# Patient Record
Sex: FEMALE | Race: WHITE
[De-identification: names, ages, dates, MRNs, and addresses within clinical notes are randomized per-mention and may not be internally consistent; named-entity substitution may affect disease eponyms.]

---

## 2018-08-28 ENCOUNTER — HOSPITAL ENCOUNTER (EMERGENCY)
Dept: HOSPITAL 47 - EC | Age: 68
LOS: 1 days | Discharge: HOME | End: 2018-08-29
Payer: MEDICARE

## 2018-08-28 DIAGNOSIS — Z79.899: ICD-10-CM

## 2018-08-28 DIAGNOSIS — G51.0: Primary | ICD-10-CM

## 2018-08-28 LAB
ALBUMIN SERPL-MCNC: 3.7 G/DL (ref 3.5–5)
ALP SERPL-CCNC: 108 U/L (ref 38–126)
ALT SERPL-CCNC: 44 U/L (ref 9–52)
ANION GAP SERPL CALC-SCNC: 7 MMOL/L
APTT BLD: 24.2 SEC (ref 22–30)
AST SERPL-CCNC: 25 U/L (ref 14–36)
BASOPHILS # BLD AUTO: 0 K/UL (ref 0–0.2)
BASOPHILS NFR BLD AUTO: 1 %
BUN SERPL-SCNC: 19 MG/DL (ref 7–17)
CALCIUM SPEC-MCNC: 9.4 MG/DL (ref 8.4–10.2)
CHLORIDE SERPL-SCNC: 106 MMOL/L (ref 98–107)
CO2 SERPL-SCNC: 26 MMOL/L (ref 22–30)
EOSINOPHIL # BLD AUTO: 0.2 K/UL (ref 0–0.7)
EOSINOPHIL NFR BLD AUTO: 3 %
ERYTHROCYTE [DISTWIDTH] IN BLOOD BY AUTOMATED COUNT: 4.5 M/UL (ref 3.8–5.4)
ERYTHROCYTE [DISTWIDTH] IN BLOOD: 13.3 % (ref 11.5–15.5)
GLUCOSE SERPL-MCNC: 98 MG/DL (ref 74–99)
HCT VFR BLD AUTO: 42.9 % (ref 34–46)
HGB BLD-MCNC: 14.1 GM/DL (ref 11.4–16)
INR PPP: 1 (ref ?–1.2)
LYMPHOCYTES # SPEC AUTO: 1.3 K/UL (ref 1–4.8)
LYMPHOCYTES NFR SPEC AUTO: 25 %
MCH RBC QN AUTO: 31.4 PG (ref 25–35)
MCHC RBC AUTO-ENTMCNC: 32.9 G/DL (ref 31–37)
MCV RBC AUTO: 95.4 FL (ref 80–100)
MONOCYTES # BLD AUTO: 0.3 K/UL (ref 0–1)
MONOCYTES NFR BLD AUTO: 6 %
NEUTROPHILS # BLD AUTO: 3.4 K/UL (ref 1.3–7.7)
NEUTROPHILS NFR BLD AUTO: 64 %
PLATELET # BLD AUTO: 229 K/UL (ref 150–450)
POTASSIUM SERPL-SCNC: 4.4 MMOL/L (ref 3.5–5.1)
PROT SERPL-MCNC: 6.2 G/DL (ref 6.3–8.2)
PT BLD: 9.5 SEC (ref 9–12)
SODIUM SERPL-SCNC: 139 MMOL/L (ref 137–145)
WBC # BLD AUTO: 5.3 K/UL (ref 3.8–10.6)

## 2018-08-28 PROCEDURE — 36415 COLL VENOUS BLD VENIPUNCTURE: CPT

## 2018-08-28 PROCEDURE — 99284 EMERGENCY DEPT VISIT MOD MDM: CPT

## 2018-08-28 PROCEDURE — 85610 PROTHROMBIN TIME: CPT

## 2018-08-28 PROCEDURE — 85730 THROMBOPLASTIN TIME PARTIAL: CPT

## 2018-08-28 PROCEDURE — 70450 CT HEAD/BRAIN W/O DYE: CPT

## 2018-08-28 PROCEDURE — 96361 HYDRATE IV INFUSION ADD-ON: CPT

## 2018-08-28 PROCEDURE — 85025 COMPLETE CBC W/AUTO DIFF WBC: CPT

## 2018-08-28 PROCEDURE — 70496 CT ANGIOGRAPHY HEAD: CPT

## 2018-08-28 PROCEDURE — 80053 COMPREHEN METABOLIC PANEL: CPT

## 2018-08-28 PROCEDURE — 70498 CT ANGIOGRAPHY NECK: CPT

## 2018-08-28 PROCEDURE — 96360 HYDRATION IV INFUSION INIT: CPT

## 2018-08-28 NOTE — CT
EXAM:

  CT Head Without Intravenous Contrast

 

CLINICAL HISTORY:

  ITS.REASON CT Reason: weak

 

TECHNIQUE:

  Axial computed tomography images of the head/brain without intravenous 

contrast.  CTDI is 59.43 mGy and DLP is 1167.78 mGy-cm.  This CT exam was 

performed using one or more of the following dose reduction techniques: 

automated exposure control, adjustment of the mA and/or kV according to 

patient size, and/or use of iterative reconstruction technique.

 

COMPARISON:

  No relevant prior studies available.

 

FINDINGS:

  Brain:  Unremarkable.  No hemorrhage.  No significant white matter 

disease.  No edema.

  Ventricles:  Unremarkable.  No ventriculomegaly.

  Bones/joints:  Unremarkable.  No acute fracture.

  Soft tissues:  Unremarkable.

  Sinuses:  Unremarkable as visualized.  No acute sinusitis.

  Mastoid air cells:  Unremarkable as visualized.  No mastoid effusion.

 

IMPRESSION:     

  Normal for age.

## 2018-08-28 NOTE — ED
General Adult HPI





- General


Chief complaint: Neuro Symptoms/Deficit


Stated complaint: Left facial weakness


Time Seen by Provider: 08/28/18 19:48


Source: patient, RN notes reviewed


Mode of arrival: wheelchair


Limitations: no limitations





- History of Present Illness


Initial comments: 





Patient is a pleasant 67-year-old female presenting to the emergency Department 

with complaints of left facial weakness.  Onset of symptoms was yesterday 

around noon.  Symptoms seemed to have worsened since that time.  Patient 

notices when she tries to eat or drink there is some leaking from the left side 

of her mouth.  No confusion.  Patient did have a mild frontal headache earlier.

  Patient has had some chronic neck discomfort for the past several months.  No 

speech problems.  No extremity weakness.  No paresthesias or loss of sensation.

  No history of similar symptoms previously.





- Related Data


 Home Medications











 Medication  Instructions  Recorded  Confirmed


 


Biotin 5 mg PO DAILY 08/28/18 08/28/18


 


Ibgard 1 tab PO DAILY 08/28/18 08/28/18


 


L.acidoph,Paracasei, B.lactis 1 cap PO DAILY 08/28/18 08/28/18





[Probiotic]   


 


Levothyroxine Sodium 100 mcg PO DAILY 08/28/18 08/28/18


 


[place] 1 tab PO DAILY 08/28/18 08/28/18


 


Multivitamins, Thera [Multivitamin 1 tab PO DAILY 08/28/18 08/28/18





(formulary)]   


 


Omeprazole 20 mg PO DAILY 08/28/18 08/28/18


 


Ranitidine HCl [Zantac] 300 mg PO BID 08/28/18 08/28/18








 Previous Rx's











 Medication  Instructions  Recorded


 


predniSONE 20 mg PO TID #20 tab 08/29/18


 


valACYclovir HCL [Valtrex] 1 tab PO TID #20 tablet 08/29/18











 Allergies











Allergy/AdvReac Type Severity Reaction Status Date / Time


 


No Known Allergies Allergy   Verified 08/28/18 20:41














Review of Systems


ROS Statement: 


Those systems with pertinent positive or pertinent negative responses have been 

documented in the HPI.





ROS Other: All systems not noted in ROS Statement are negative.


Constitutional: Denies: fever


Eyes: Reports: other (Left eye does feel dry).  Denies: eye pain


ENT: Denies: ear pain


Respiratory: Denies: cough


Cardiovascular: Denies: chest pain


Endocrine: Denies: fatigue


Gastrointestinal: Denies: abdominal pain


Genitourinary: Denies: dysuria


Musculoskeletal: Denies: back pain


Skin: Denies: rash


Neurological: Reports: weakness (Left side of face).  Denies: confusion





Past Medical History


Past Medical History: No Reported History


History of Any Multi-Drug Resistant Organisms: None Reported


Past Surgical History: Cholecystectomy, Hysterectomy, Orthopedic Surgery


Past Psychological History: No Psychological Hx Reported


Smoking Status: Never smoker


Past Alcohol Use History: None Reported


Past Drug Use History: None Reported





General Exam


Limitations: no limitations


General appearance: alert, in no apparent distress


Head exam: Present: atraumatic


Eye exam: Present: normal appearance, PERRL, EOMI.  Absent: nystagmus


ENT exam: Present: normal oropharynx


Neck exam: Present: normal inspection


Respiratory exam: Present: normal lung sounds bilaterally


Cardiovascular Exam: Present: regular rate, normal rhythm


GI/Abdominal exam: Present: soft.  Absent: tenderness


Extremities exam: Present: normal inspection


Neurological exam: Present: alert, CN II-XII intact (Except for left facial 

weakness that does include the forehead and eyelid muscles), other (Left facial 

weakness is present that does involve the forehead).  Absent: motor sensory 

deficit


  ** Expanded


Neurological exam: Present: protecting the airway


Patient oriented to: Present: person, place, time


Speech: Present: fluid speech


Cranial nerves: EOM's Intact: Normal


Cerebellar function: Finger to Nose: Normal


Sensory exam: Upper Extremity Light Touch: Normal, Lower Extremity Light Touch: 

Normal


Motor strength exam: RUE: 5, LUE: 5, RLE: 5, LLE: 5


Eye Response: (4) open spontaneously


Motor Response: (6) obeys commands


Verbal Response: (5) oriented


Psychiatric exam: Present: normal affect, normal mood


Skin exam: Present: normal color





Course


 Vital Signs











  08/28/18 08/28/18 08/28/18





  19:37 19:45 20:00


 


Temperature 97.8 F  


 


Pulse Rate 73 60 62


 


Respiratory 18 18 18





Rate   


 


Blood Pressure 174/91 161/72 171/77


 


O2 Sat by Pulse 99 99 98





Oximetry   














  08/28/18





  22:29


 


Temperature 


 


Pulse Rate 64


 


Respiratory 17





Rate 


 


Blood Pressure 142/81


 


O2 Sat by Pulse 98





Oximetry 














Medical Decision Making





- Medical Decision Making





Patient reevaluated and updated.





- Lab Data


Result diagrams: 


 08/28/18 20:45





 08/28/18 20:45


 Lab Results











  08/28/18 08/28/18 08/28/18 Range/Units





  20:45 20:45 20:45 


 


WBC  5.3    (3.8-10.6)  k/uL


 


RBC  4.50    (3.80-5.40)  m/uL


 


Hgb  14.1    (11.4-16.0)  gm/dL


 


Hct  42.9    (34.0-46.0)  %


 


MCV  95.4    (80.0-100.0)  fL


 


MCH  31.4    (25.0-35.0)  pg


 


MCHC  32.9    (31.0-37.0)  g/dL


 


RDW  13.3    (11.5-15.5)  %


 


Plt Count  229    (150-450)  k/uL


 


Neutrophils %  64    %


 


Lymphocytes %  25    %


 


Monocytes %  6    %


 


Eosinophils %  3    %


 


Basophils %  1    %


 


Neutrophils #  3.4    (1.3-7.7)  k/uL


 


Lymphocytes #  1.3    (1.0-4.8)  k/uL


 


Monocytes #  0.3    (0-1.0)  k/uL


 


Eosinophils #  0.2    (0-0.7)  k/uL


 


Basophils #  0.0    (0-0.2)  k/uL


 


PT    9.5  (9.0-12.0)  sec


 


INR    1.0  (<1.2)  


 


APTT    24.2  (22.0-30.0)  sec


 


Sodium   139   (137-145)  mmol/L


 


Potassium   4.4   (3.5-5.1)  mmol/L


 


Chloride   106   ()  mmol/L


 


Carbon Dioxide   26   (22-30)  mmol/L


 


Anion Gap   7   mmol/L


 


BUN   19 H   (7-17)  mg/dL


 


Creatinine   0.86   (0.52-1.04)  mg/dL


 


Est GFR (CKD-EPI)AfAm   82   (>60 ml/min/1.73 sqM)  


 


Est GFR (CKD-EPI)NonAf   71   (>60 ml/min/1.73 sqM)  


 


Glucose   98   (74-99)  mg/dL


 


Calcium   9.4   (8.4-10.2)  mg/dL


 


Total Bilirubin   0.3   (0.2-1.3)  mg/dL


 


AST   25   (14-36)  U/L


 


ALT   44   (9-52)  U/L


 


Alkaline Phosphatase   108   ()  U/L


 


Total Protein   6.2 L   (6.3-8.2)  g/dL


 


Albumin   3.7   (3.5-5.0)  g/dL














- Radiology Data


Radiology results: report reviewed (Computed tomography scan the brain shows no 

acute process.  CT angiogram of the head and neck shows no acute process)





Disposition


Clinical Impression: 


 Bell's palsy





Disposition: HOME SELF-CARE


Condition: Stable


Instructions:  Bell Palsy (ED)


Additional Instructions: 


Apply Lacri-Lube to left eye every 4 hours while awake and prior to going to 

bed.  Please tape her eye shut at bedtime.  These follow-up with primary care 

physician in the next couple of days for recheck.


Prescriptions: 


predniSONE 20 mg PO TID #20 tab


valACYclovir HCL [Valtrex] 1 tab PO TID #20 tablet


Is patient prescribed a controlled substance at d/c from ED?: No


Referrals: 


Yarelis Mane MD [STAFF PHYSICIAN] - 1-2 days


Time of Disposition: 00:07

## 2018-08-28 NOTE — CT
EXAM:

  CT Angiography Head With Intravenous Contrast

 

CLINICAL HISTORY:

  ITS.REASON CT Reason: weakness

 

TECHNIQUE:

  Axial computed tomographic angiography images of the head with 

intravenous contrast using CT angiography protocol.  DLP: 451.73  This CT 

exam was performed using one or more of the following dose reduction 

techniques: automated exposure control, adjustment of the mA and/or kV 

according to patient size, and/or use of iterative reconstruction 

technique.

  MIP reconstructed images were created and reviewed.

 

COMPARISON:

  No relevant prior studies available.

 

FINDINGS:

  Right internal carotid artery:  No acute findings.  Intracranial 

segment is patent with no significant stenosis.  No aneurysm.

  Right anterior cerebral artery:  Unremarkable.  No occlusion or 

significant stenosis.  No aneurysm.

  Right middle cerebral artery:  Unremarkable.  No occlusion or 

significant stenosis.  No aneurysm.

  Right posterior cerebral artery:  Unremarkable.  No occlusion or 

significant stenosis.  No aneurysm.

  Right vertebral artery:  Unremarkable as visualized.

 

  Left internal carotid artery:  No acute findings.  Intracranial segment 

is patent with no significant stenosis.  No aneurysm.

  Left anterior cerebral artery:  Unremarkable.  No occlusion or 

significant stenosis.  No aneurysm.

  Left middle cerebral artery:  Unremarkable.  No occlusion or 

significant stenosis.  No aneurysm.

  Left posterior cerebral artery:  Unremarkable.  No occlusion or 

significant stenosis.  No aneurysm.

  Left vertebral artery:  Unremarkable as visualized.

 

  Basilar artery:  Unremarkable.  No occlusion or significant stenosis.  

No aneurysm.

 

IMPRESSION:     

  No significant stenosis, thrombosis, aneurysm or dissection.

 

_______________________________________________

 

EXAM:

  CT Angiography Neck With Intravenous Contrast

 

CLINICAL HISTORY:

  ITS.REASON CT Reason: weakness

 

TECHNIQUE:

  Axial computed tomographic angiography images of the neck with 

intravenous contrast using CT angiography protocol.  DLP: 451.73  This CT 

exam was performed using one or more of the following dose reduction 

techniques: automated exposure control, adjustment of the mA and/or kV 

according to patient size, and/or use of iterative reconstruction 

technique.

  MIP reconstructed images were created and reviewed.

 

COMPARISON:

  No relevant prior studies available.

 

FINDINGS:

 

 VASCULATURE:

  Right common carotid artery:  Unremarkable.  No significant stenosis.  

No dissection or occlusion.

  Right internal carotid artery:  Unremarkable.  Extracranial segment is 

patent with no significant stenosis.  No dissection or occlusion.

  Right external carotid artery:  Unremarkable.  No occlusion.

  Right vertebral artery:  Unremarkable.  No significant stenosis.  No 

dissection or occlusion.

 

  Left common carotid artery:  Unremarkable.  No significant stenosis.  

No dissection or occlusion.

  Left internal carotid artery:  Unremarkable.  Extracranial segment is 

patent with no significant stenosis.  No dissection or occlusion.

  Left external carotid artery:  Unremarkable.  No occlusion.

  Left vertebral artery:  Unremarkable.  No significant stenosis.  No 

dissection or occlusion.

 

 NECK:

  Bones/joints:  No acute fracture.  No dislocation.

  Soft tissues:  Unremarkable as visualized.  No mass.

  Lung apices:  Nonspecific groundglass opacities and interlobular septal 

thickening at the upper lobes.  Superior mediastinal lipomatosis.

 

 CAROTID STENOSIS REFERENCE USING NASCET CRITERIA:

  % ICA stenosis = (1 - narrowest ICA diameter/diameter of distal 

cervical ICA) x 100.

  Mild - <50% stenosis.

  Moderate - 50-69% stenosis.

  Severe - 70-94% stenosis.

  Near occlusion - 95-99% stenosis.

  Occluded - 100% stenosis.

 

IMPRESSION:     

  No significant stenosis, thrombosis, aneurysm or dissection.

## 2018-08-29 VITALS
HEART RATE: 78 BPM | DIASTOLIC BLOOD PRESSURE: 77 MMHG | RESPIRATION RATE: 18 BRPM | TEMPERATURE: 98.2 F | SYSTOLIC BLOOD PRESSURE: 165 MMHG

## 2019-08-16 ENCOUNTER — HOSPITAL ENCOUNTER (OUTPATIENT)
Dept: HOSPITAL 47 - RADMAMWWP | Age: 69
Discharge: HOME | End: 2019-08-16
Attending: FAMILY MEDICINE
Payer: MEDICARE

## 2019-08-16 DIAGNOSIS — Z12.31: Primary | ICD-10-CM

## 2019-08-16 PROCEDURE — 77067 SCR MAMMO BI INCL CAD: CPT

## 2019-08-16 PROCEDURE — 77063 BREAST TOMOSYNTHESIS BI: CPT

## 2019-08-20 NOTE — MM
Reason for exam: screening  (asymptomatic).

Last mammogram was performed 2 years and 11 months ago.



History:

Patient is postmenopausal and history of other cancer.

Right Breast Aspiration of the right breast, 2012.  Benign right mammotome panel 

of the right breast, September 25, 2009.

Took estrogen for 8 years 10 months beginning at age 50.



Physical Findings:

A clinical breast exam by your physician is recommended on an annual basis and 

results should be correlated with mammographic findings.



MG 3D Screening Mammo W/Cad

Bilateral CC and MLO view(s) were taken.

Prior study comparison: September 26, 2016, bilateral MG diagnostic mammo w CAD 

[facility].  October 3, 2014, bilateral MG screening mammo w CAD.

There are scattered fibroglandular densities.  Previous mammotome biopsy in the 

right breast x 2.  No significant changes when compared with prior studies.





ASSESSMENT: Benign, BI-RAD 2



RECOMMENDATION:

Routine screening mammogram of both breasts in 1 year.

## 2021-08-31 ENCOUNTER — HOSPITAL ENCOUNTER (OUTPATIENT)
Dept: HOSPITAL 47 - RADMAMWWP | Age: 71
Discharge: HOME | End: 2021-08-31
Attending: FAMILY MEDICINE
Payer: MEDICARE

## 2021-08-31 DIAGNOSIS — R92.8: ICD-10-CM

## 2021-08-31 DIAGNOSIS — Z12.39: Primary | ICD-10-CM

## 2021-08-31 DIAGNOSIS — N64.9: ICD-10-CM

## 2021-08-31 PROCEDURE — 77067 SCR MAMMO BI INCL CAD: CPT

## 2021-08-31 PROCEDURE — 77063 BREAST TOMOSYNTHESIS BI: CPT

## 2022-09-14 ENCOUNTER — HOSPITAL ENCOUNTER (OUTPATIENT)
Dept: HOSPITAL 47 - RADMAMWWP | Age: 72
Discharge: HOME | End: 2022-09-14
Attending: FAMILY MEDICINE
Payer: MEDICARE

## 2022-09-14 DIAGNOSIS — Z12.31: Primary | ICD-10-CM

## 2022-09-14 DIAGNOSIS — Z78.0: ICD-10-CM

## 2022-09-14 PROCEDURE — 77067 SCR MAMMO BI INCL CAD: CPT

## 2022-09-14 PROCEDURE — 77063 BREAST TOMOSYNTHESIS BI: CPT

## 2022-12-22 NOTE — MM
Are you ok with ordering pulmonary rehab for this patient?  Her last PFT was May 2022 and is due to follow up with you in Feb 2023   Reason for Exam: Screening  (asymptomatic). 

Last mammogram was performed 1 year(s) and 1 month(s) ago. 





Patient History: 

Menarche at age 14. First Full-Term Pregnancy at age 22. Left ovary removed at age 50. Right ovary

removed at age 50. Hysterectomy at age 50. Postmenopausal. Other cancer, age 70. Estrogen for 8

years, 10 months, from age 50 until age 58. 2012, Cyst Aspiration on the Right side. 9/25/2009,

Benign Core Biopsy on the right side. 





Risk Values: 

Britni 5 year model risk: 1.8%.

NCI Lifetime model risk: 5.3%.





Prior Study Comparison: 

9/26/2016 Bilateral Diagnostic Mammogram, [facility]. 8/16/2019 Bilateral Screening Mammogram, [facility].

8/31/2021 Bilateral Screening Mammogram, [facility]. 





Tissue Density: 

The breast tissue is almost entirely fat.





Findings: 

Analyzed By CAD. 

There is no suspicious group of microcalcifications or new suspicious mass in either breast. 





Overall Assessment: Negative, BI-RAD 1





Management: 

Screening Mammogram of both breasts in 1 year.

A clinical breast exam by your physician is recommended on an annual basis and results should be

correlated with mammographic findings.



Electronically signed and approved by: Ced Baker DO

## 2023-09-15 ENCOUNTER — HOSPITAL ENCOUNTER (OUTPATIENT)
Dept: HOSPITAL 47 - RADMAMWWP | Age: 73
Discharge: HOME | End: 2023-09-15
Attending: FAMILY MEDICINE
Payer: MEDICARE

## 2023-09-15 DIAGNOSIS — Z78.0: ICD-10-CM

## 2023-09-15 DIAGNOSIS — Z12.31: Primary | ICD-10-CM

## 2023-09-15 PROCEDURE — 77067 SCR MAMMO BI INCL CAD: CPT

## 2023-09-15 PROCEDURE — 77063 BREAST TOMOSYNTHESIS BI: CPT

## 2023-09-18 NOTE — MM
Reason for Exam: Screening  (asymptomatic). 

Last screening mammogram was performed 12 month(s) ago.





Patient History: 

Menarche at age 14. First Full-Term Pregnancy at age 22. Left ovary removed at age 50. Right ovary

removed at age 50. Hysterectomy at age 50. Postmenopausal. Other cancer, age 70. Estrogen for 8

years, 10 months, from age 50 until age 58. 2012, Cyst Aspiration on the Right side. 9/25/2009,

Benign Core Biopsy on the right side.

Mother had ovarian cancer, age 65. Sister had ovarian cancer, age 58. 





Risk Values: 

Britni 5 year model risk: 1.8%.

NCI Lifetime model risk: 5.1%.





Prior Study Comparison: 

8/16/2019 Bilateral Screening Mammogram, [facility]. 8/31/2021 Bilateral Screening Mammogram, [facility].

9/14/2022 Bilateral MG 3D screening mammo w/cad, [facility]. 





Tissue Density: 

The breast tissue is almost entirely fat.





Findings: 

Analyzed By CAD. 

There is no suspicious group of microcalcifications or new suspicious mass. 





Overall Assessment: Negative, BI-RAD 1





Management: 

Screening Mammogram of both breasts in 1 year.

[facility] will attempt to contact patient to return for supplemental views and

ultrasound if indicated.



Patient should continue monthly self-breast exams.  A clinical breast exam by your physician is

recommended on an annual basis.

This exam should not preclude additional follow-up of suspicious palpable abnormalities.



Note on Britni scores and lifetime risk:

1. A Britni score greater than 3% is considered moderate risk. If this is the case, consider

specialist referral to assess eligibility for a risk reducing agent.

2. If overall lifetime risk for the development of breast cancer is 20% or higher, the patient may

qualify for future screening with alternating mammogram and breast MRI.



Electronically signed and approved by: Ced Baker DO

## 2024-10-18 NOTE — MM
Is This A New Presentation, Or A Follow-Up?: Skin Lesion
Reason for exam: screening  (asymptomatic).

Last mammogram was performed 2 years ago.



History:

Patient is postmenopausal and has history of other cancer at age 70.

Right Breast Aspiration of the right breast, 2012.  Benign right mammotome panel 

of the right breast, September 25, 2009.

Took estrogen for 8 years 10 months beginning at age 50.



Physical Findings:

A clinical breast exam by your physician is recommended on an annual basis and 

results should be correlated with mammographic findings.



MG 3D Screening Mammo W/Cad

Bilateral CC and MLO view(s) were taken.

Prior study comparison: August 16, 2019, bilateral MG 3d screening mammo w/cad.  

September 26, 2016, bilateral MG diagnostic mammo w CAD [facility].

There are scattered fibroglandular densities.  Previous mammotome biopsy in the 

right breast.  No significant changes when compared with prior studies.





ASSESSMENT: Negative, BI-RAD 1



RECOMMENDATION:

Routine screening mammogram of both breasts in 1 year.
treated_been_treated